# Patient Record
Sex: FEMALE | Race: WHITE | NOT HISPANIC OR LATINO | Employment: OTHER | URBAN - METROPOLITAN AREA
[De-identification: names, ages, dates, MRNs, and addresses within clinical notes are randomized per-mention and may not be internally consistent; named-entity substitution may affect disease eponyms.]

---

## 2025-04-14 ENCOUNTER — OFFICE VISIT (OUTPATIENT)
Dept: ORTHOPEDIC SURGERY | Facility: CLINIC | Age: 75
End: 2025-04-14
Payer: MEDICARE

## 2025-04-14 ENCOUNTER — HOSPITAL ENCOUNTER (OUTPATIENT)
Dept: RADIOLOGY | Facility: CLINIC | Age: 75
Discharge: HOME | End: 2025-04-14
Payer: MEDICARE

## 2025-04-14 VITALS — HEIGHT: 65 IN | WEIGHT: 213 LBS | BODY MASS INDEX: 35.49 KG/M2

## 2025-04-14 DIAGNOSIS — M54.50 LUMBAR SPINE PAIN: ICD-10-CM

## 2025-04-14 DIAGNOSIS — M54.16 LUMBAR RADICULOPATHY: ICD-10-CM

## 2025-04-14 PROCEDURE — 99214 OFFICE O/P EST MOD 30 MIN: CPT | Performed by: INTERNAL MEDICINE

## 2025-04-14 PROCEDURE — 72110 X-RAY EXAM L-2 SPINE 4/>VWS: CPT

## 2025-04-14 PROCEDURE — 99213 OFFICE O/P EST LOW 20 MIN: CPT | Performed by: INTERNAL MEDICINE

## 2025-04-14 PROCEDURE — 3008F BODY MASS INDEX DOCD: CPT | Performed by: INTERNAL MEDICINE

## 2025-04-14 RX ORDER — PREDNISONE 50 MG/1
50 TABLET ORAL DAILY
Qty: 5 TABLET | Refills: 0 | Status: SHIPPED | OUTPATIENT
Start: 2025-04-14 | End: 2025-04-19

## 2025-04-14 ASSESSMENT — PATIENT HEALTH QUESTIONNAIRE - PHQ9
2. FEELING DOWN, DEPRESSED OR HOPELESS: NOT AT ALL
1. LITTLE INTEREST OR PLEASURE IN DOING THINGS: NOT AT ALL
SUM OF ALL RESPONSES TO PHQ9 QUESTIONS 1 AND 2: 0

## 2025-04-14 NOTE — PROGRESS NOTES
Acute Injury New Patient Visit    CC:   Chief Complaint   Patient presents with    Lower Back - Pain     Patient was lifting boxes Saturday 02/12/25 and hurt her lower back.  Xrays today       HPI: Charo is a 74 y.o. female presents today for evaluation for acute low back pain which started two days ago after lifting some boxes. She denies any bladder or bowel incontinence. No pain radiating down her legs. She is here for initial evaluation and x-rays.         Review of Systems   GENERAL: Negative for malaise, significant weight loss, fever  MUSCULOSKELETAL: See HPI  NEURO:  Negative for numbness / tingling     Past Medical History  Past Medical History:   Diagnosis Date    Personal history of other diseases of the circulatory system 11/20/2014    History of hypertension    Personal history of other diseases of the circulatory system     History of essential hypertension       Medication review  Medication Documentation Review Audit    **Prior to Admission medications have not yet been reviewed**         Allergies  No Known Allergies    Social History  Social History     Socioeconomic History    Marital status:      Spouse name: Not on file    Number of children: Not on file    Years of education: Not on file    Highest education level: Not on file   Occupational History    Not on file   Tobacco Use    Smoking status: Never    Smokeless tobacco: Never   Substance and Sexual Activity    Alcohol use: Yes     Comment: Occ    Drug use: Never    Sexual activity: Not on file   Other Topics Concern    Not on file   Social History Narrative    Not on file     Social Drivers of Health     Financial Resource Strain: Low Risk  (10/1/2024)    Received from [facility]    Overall Financial Resource Strain (CARDIA)     Difficulty of Paying Living Expenses: Not hard at all   Food Insecurity: No Food Insecurity (10/1/2024)    Received from [facility]    Hunger Vital Sign     Worried About Running Out of Food in the  Last Year: Never true     Ran Out of Food in the Last Year: Never true   Transportation Needs: No Transportation Needs (10/1/2024)    Received from [facility]    PRAPARE - Transportation     Lack of Transportation (Medical): No     Lack of Transportation (Non-Medical): No   Physical Activity: Sufficiently Active (10/1/2024)    Received from [facility]    Exercise Vital Sign     Days of Exercise per Week: 5 days     Minutes of Exercise per Session: 90 min   Stress: No Stress Concern Present (10/1/2024)    Received from [facility]    Citizen of the [place] [employer] - Occupational Stress Questionnaire     Feeling of Stress : Not at all   Social Connections: Moderately Integrated (10/1/2024)    Received from [facility]    Social Connection and Isolation Panel [NHANES]     Frequency of Communication with Friends and Family: More than three times a week     Frequency of Social Gatherings with Friends and Family: Once a week     Attends Jew Services: More than 4 times per year     Active Member of Clubs or Organizations: No     Attends Club or Organization Meetings: Patient declined     Marital Status:    Intimate Partner Violence: Not At Risk (10/1/2024)    Received from [facility]    Humiliation, Afraid, Rape, and Kick questionnaire     Fear of Current or Ex-Partner: No     Emotionally Abused: No     Physically Abused: No     Sexually Abused: No   Housing Stability: Low Risk  (10/1/2024)    Received from [facility]    Housing Stability Vital Sign     Unable to Pay for Housing in the Last Year: No     Number of Times Moved in the Last Year: 0     Homeless in the Last Year: No       Surgical History  Past Surgical History:   Procedure Laterality Date    KNEE ARTHROSCOPY W/ DEBRIDEMENT  11/20/2014    Knee Arthroscopy (Therapeutic)    THYROID SURGERY  11/20/2014    Thyroid Surgery    TONSILLECTOMY  11/20/2014    Tonsillectomy       Physical Exam:  GENERAL:  Patient is awake, alert, and oriented to  person place and time.  Patient appears well nourished and well kept.  Affect Calm, Not Acutely Distressed.  HEENT:  Normocephalic, Atraumatic, EOMI  CARDIOVASCULAR:  Hemodynamically stable.  RESPIRATORY:  Normal respirations with unlabored breathing.  Extremity: Lumbar spine examination shows skin is intact.  There is no midline lumbar tenderness.  She has no pain with forward flexion and reverse extension.  Mild pain with left and right lateral rotation.  Mild pain of the SI joints.  Quadricep strength is 5/5 on the right, and 5/5 the left.  Negative straight leg test on the right leg or left leg.  She is able to walk on her tiptoes and heels with no difficulties.  She can flex both hips at 90 degrees, there is no pain with internal or external rotation.      Diagnostics: X-rays reviewed      Procedure: None    Assessment:   Lumbar degenerative disc disease      Plan: Charo  presents today for evaluation for acute low back pain which started two days ago after lifting some boxes. X-rays showed no obvious fractures but arthritic changes.  She has minimal radicular symptoms, we recommended a short dose of oral prednisone 50 mg for 5 days and physical therapy. She will follow-up with the spine team in 2-3 weeks for reevaluation.  If no improvement or increased radicular symptoms, may consider further advanced imaging such as MRI of the lumbar spine.    Orders Placed This Encounter    XR lumbar spine complete 4+ views      At the conclusion of the visit there were no further questions by the patient/family regarding their plan of care.  Patient was instructed to call or return with any issues, questions, or concerns regarding their injury and/or treatment plan described above.     04/14/25 at 9:20 AM - Javier Friedman MD  Scribe Attestation  By signing my name below, IAbdon Scribe   attest that this documentation has been prepared under the direction and in the presence of Javier Friedman,  MD.    Office: [phone]    This note was prepared using voice recognition software.  The details of this note are correct and have been reviewed, and corrected to the best of my ability.  Some grammatical errors may persist related to the Dragon software.

## 2025-04-23 ENCOUNTER — TELEPHONE (OUTPATIENT)
Dept: ORTHOPEDIC SURGERY | Facility: CLINIC | Age: 75
End: 2025-04-23
Payer: MEDICARE

## 2025-04-23 NOTE — TELEPHONE ENCOUNTER
nurse navigator Haja Chris called and left a  and stated patient was having increased symptoms, she was having pain down her legs now, with numbness.  She was asking if patients 04/30/25 appointment could be moved up.    I advised Dr. Friedman and he would like patient to be seen ASAP.  Patient's appointment was moved to 04-24-25.  Patient was called, no answer.  I did leave a very detailed message regarding the new appointment date and time and location.   I also called the nurse navigator back at [phone] to let her know of this change to the appointment as well.  She stated she would try to reach the patient as well.

## 2025-04-24 ENCOUNTER — OFFICE VISIT (OUTPATIENT)
Dept: ORTHOPEDIC SURGERY | Facility: CLINIC | Age: 75
End: 2025-04-24
Payer: MEDICARE

## 2025-04-24 DIAGNOSIS — M54.16 LUMBAR RADICULOPATHY: Primary | ICD-10-CM

## 2025-04-24 RX ORDER — CYCLOBENZAPRINE HCL 10 MG
10 TABLET ORAL 3 TIMES DAILY PRN
Qty: 30 TABLET | Refills: 0 | Status: SHIPPED | OUTPATIENT
Start: 2025-04-24 | End: 2025-05-04

## 2025-04-24 NOTE — PROGRESS NOTES
New patient established to the practice comes the office with her  who is also historian patient's condition symptoms treatment results.  She saw one of our nonsurgical orthopods.  Was put on some steroids and it can cause her to have some headaches and issues.  She tried taking some ibuprofen also.  She complains of low back pain.  After lifting something it causes pain to both her leg especially the thighs to the knees but nothing really distal.  No bowel or bladder complaints no saddle anesthesia.  No spine surgeries in the past.  Physical therapy is ordered she is starting to start that.    Looked at patient's recent blood work.  We checked a metabolic panel glucose 90 sodium 142 potassium 4.2 we looked at primary doctors notes we checked medication list I do not see any type of statin medication or major blood thinners listed in her chart.    Physical exam: Well-nourished, well kept.  No lymphangitis or lymphadenopathy in the examined extremities.  Good perfusion to the extremities ×4.  Radial and dorsalis pedis pulses 2+.  Capillary refill to all 4 digits brisk.  No distal edema x 4.  Patient ambulating with antalgic gait.  Slow to get from sitting to standing.  Full range of motion cervical spine and observation no instability.  Decreased range of motion flexion-extension rotation lumbar spine no instability moving upper extremities on any difficulty motor strength intact.   examination of the lower extremities reveals no point tenderness, swelling, or deformity.  Range of motion of the hips, knees, and ankles are full without crepitance, instability, or exacerbation of pain.  Strength is 5/5 throughout.  No redness, abrasions, or lesions on all 4 extremities, head and neck, or trunk.  Patient neurologically intact    X-ray: X-ray lumbar spine that were taken prior reviewed shows arthritic degenerative changes diffusely in the lumbar spine.  There is a scoliosis about 20 degrees on the AP.  Hip joints  preserved.  There is a spondylolisthesis at L4-5 and L5-S1 which is mildly dynamic on flexion and extension.    Assessment: This a patient with acute onset of low back pain radicular symptoms down the legs.  Sensory changes affecting her daily and bodily function that needs to be worked up ASAP.    Plan: Patient is angie continue with her physical therapy will try a mild muscle relaxant after dinner and I will get an MRI at [facility] for diagnostic purposes.

## 2025-04-30 ENCOUNTER — APPOINTMENT (OUTPATIENT)
Dept: ORTHOPEDIC SURGERY | Facility: CLINIC | Age: 75
End: 2025-04-30
Payer: MEDICARE

## 2025-05-01 DIAGNOSIS — M54.16 LUMBAR RADICULOPATHY: ICD-10-CM

## 2025-05-01 DIAGNOSIS — M54.50 LUMBAR SPINE PAIN: ICD-10-CM

## 2025-05-01 RX ORDER — CYCLOBENZAPRINE HCL 10 MG
10 TABLET ORAL 3 TIMES DAILY PRN
Qty: 30 TABLET | Refills: 0 | Status: SHIPPED | OUTPATIENT
Start: 2025-05-01 | End: 2025-05-11

## 2025-05-06 ENCOUNTER — APPOINTMENT (OUTPATIENT)
Dept: RADIOLOGY | Facility: HOSPITAL | Age: 75
End: 2025-05-06
Payer: MEDICARE

## 2025-05-06 DIAGNOSIS — M54.16 LUMBAR RADICULOPATHY: ICD-10-CM

## 2025-05-06 PROCEDURE — 72148 MRI LUMBAR SPINE W/O DYE: CPT | Performed by: RADIOLOGY

## 2025-05-06 PROCEDURE — 72148 MRI LUMBAR SPINE W/O DYE: CPT

## 2025-05-08 ENCOUNTER — APPOINTMENT (OUTPATIENT)
Dept: ORTHOPEDIC SURGERY | Facility: CLINIC | Age: 75
End: 2025-05-08
Payer: MEDICARE

## 2025-05-08 DIAGNOSIS — M54.16 LUMBAR RADICULOPATHY: Primary | ICD-10-CM

## 2025-05-08 PROCEDURE — 99214 OFFICE O/P EST MOD 30 MIN: CPT | Performed by: PHYSICIAN ASSISTANT

## 2025-05-08 NOTE — PROGRESS NOTES
Established patient here with her  to go over her lumbar MRIs.  The patient was having low back pain.  Bilateral leg symptoms down the thighs.  But nothing really distal from the knees down.  No bowel or bladder complaints no saddle anesthesia.  She cannot stand she cannot walk for long periods of time.  She has had physical therapy over-the-counter meds prescription meds and not helping.    Physical exam: Well-nourished, well kept.  No lymphangitis or lymphadenopathy in the examined extremities.  Good perfusion to the extremities ×4.  Radial and dorsalis pedis pulses 2+.  Capillary refill to all 4 digits brisk.  No distal edema x 4.  Patient ambulating with no major difficulty moving lower extremities without any major difficulty.  Some global weakness in the upper thighs..  No redness, abrasions, or lesions on the lower extremities bilaterally.  Patient neurologically intact    I looked at patient's recent bone density study which is -1.5 that was just done on October 2024.  I also spoke to the patient about blood thinner and she states she is really not taking anything besides a vitamin D.    MRI: MRI was reviewed report reviewed as well at the L1-2 there is some central stenosis.  But the foramen do not appear to be too bad I would mild bilateral foraminal narrowing.  L2-3 shows some moderate central narrowing.  With moderate narrowing of the right foramen and mild to moderate of the left.  L3-4 shows moderate central stenosis and bilateral foraminal stenosis.  L4-5 shows severe canal stenosis.  Moderate bilateral foraminal stenosis and a spondylolisthesis.  L5-S1 shows a spondylolisthesis also.  But not much in the way of central canal stenosis.  Looked at patient's x-rays also.  She does have a scoliosis.  She does collapse at that L3-4 level.  There is a spondylolisthesis at L4-5 and L5-S1.    Assessment: Is a patient with back pain bilateral leg symptoms.  Affecting her daily and bodily function we  talked about treatment option.  Talked about continuing conservative care spine epidural injections and if she gets an injection there may be delaying surgical intervention due to risk of infection we talked about surgical fixation.  She would like to discuss that.  I explained to the patient that surgery for her would be stabilizing those slips at L4-5 and L5-S1.  Possible lateral approach at that L3-4 to see if we can got a corrected scoliosis stabilization at all those levels.  And posterior lateral fusion also.  We discussed all those risk benefits and options with her.        Plan: At this point.  I will start a pain management referral in case she decides to go with injections.  I will have her see Dr. Martinez as soon as possible to go over a surgical plan with her and then she can make a decision from there.

## 2025-05-09 ENCOUNTER — OFFICE VISIT (OUTPATIENT)
Dept: ORTHOPEDIC SURGERY | Facility: CLINIC | Age: 75
End: 2025-05-09
Payer: MEDICARE

## 2025-05-09 DIAGNOSIS — M54.16 LUMBAR RADICULOPATHY: Primary | ICD-10-CM

## 2025-05-09 PROCEDURE — 99212 OFFICE O/P EST SF 10 MIN: CPT | Performed by: ORTHOPAEDIC SURGERY

## 2025-05-09 NOTE — PROGRESS NOTES
"Charo Meek \"Jen\" is a 74 y.o. female who presents for New Patient Visit of the Lower Back (Into both legs/Sent by Galindo Galeas PA-C/X-rays and MRI done at ).    HPI:  74-year-old female here for new patient surgical evaluation with low back pain and bilateral leg symptoms.  Sent by Gabino Galeas.  She denies any fever chills nausea vomiting night sweats.  She has no bowel or bladder complaints.    Physical exam:  Well-nourished, well kept.No lymphangitis or lymphadenopathy in the examined extremities.  Gait normal.  Can stand on heels and toes.   Examination of the back shows tenderness in the paraspinous musculature.  There is decreased range of motion in all directions due to guarding/muscle spasms and pain at extremes.  There is good strength and no instability.  Examination of the lower extremities reveals no point tenderness, swelling, or deformity.  Range of motion of the hips, knees, and ankles are full without crepitance, instability, or exacerbation of pain.  Strength is 5/5 throughout.  No redness, abrasions, or lesions on extremities  Gross sensation intact in the extremities.  Deep tendon reflexes 1+ bilateral. Clonus negative.  Affect normal.  Alert and oriented ×3.  Coordination normal.    Imaging studies:  We reviewed an MRI of the lumbar spine today from 8/6/2025.  X-rays of the lumbar spine from April 14, 2025 were reviewed.    Assessment:  74-year-old female here for new patient surgical evaluation with low back pain and bilateral leg symptoms right greater than left.  Sent by Gabino Galesa.  She is describing claudication symptoms and radicular pain mostly in the right greater than left leg.  She does have some minimal back pain but this is chiefly a leg issue.  This has been going on for 1 month.  Prior to a month ago she had no issues with walking or pain.  She cannot walk more than 20 or 30 feet.  She has a spondylolisthesis at L4-5 and L5-S1.  She has a significant scoliosis.  She " has got moderate to severe central stenosis at  L3-4 and L4-5 and to a lesser degree L2-3.  The patient is asking about injections versus surgery however I do not think with the patient's MRI findings and claudication symptoms that injections are going to give her any relief.  She has been doing physical therapy which is not giving her any relief.    We have reviewed test today, x-rays, MRI.  We reviewed the notes from Gabino Galeas from May 8, 2025.  This is an undiagnosed new problem with uncertain prognosis that is severely inhibiting her bodily function.  We did consider and discussed surgery today.    For complete plan and/or surgical details, please refer to Dr. Martinez's portion of this split dictation.    -Tom Rivera PA-C    In a face-to-face encounter, I performed a history and physical examination, discussed pertinent diagnostic studies if indicated, and discussed diagnosis and management strategies with both the patient and the midlevel provider.  I reviewed the midlevel's note and agree with the documented findings and plan of care.    Patient with a 1 month history of neurogenic claudication.  She now cannot walk anything more than 20 or 30 feet without having to stop and rest before she walks again.  She has buttock hamstring and thigh pain and weakness and heaviness.  She has some back pain but not that much.  No fevers chills nausea vomiting.  No bowel or bladder changes.  No history of spine surgery.  She is done physical therapy and failed that.  She says she cannot live like this anymore.  She is severely inhibited with bodily function.  There is no bowel or bladder changes.  There is no saddle anesthesia.    On exam her back is clear.  She has slight decreased back range of motion.  She has good strength no instability.  She walks slowly but steadily and normally.  She has good strength bilateral extremities.    X-rays were reviewed from April 14 and an MRI was reviewed from May 6, 2025.   She has a scoliosis centered around L2-3 L3-4 of her lumbar spine.  She has stenosis centrally at L3-4 and somewhat on the right foramen.  At L4-5 she has more significant stenosis which is moderate to severe centrally as well as bilateral foraminally from the spondylolisthesis.  At L5-S1 there is some lateral recess left greater than right foraminal stenosis and a spondylolisthesis.    Patient with above described imaging studies.  She has neurogenic claudication symptoms.  Its likely just the L4-5 level giving her problem but I think the L3-4 level is likely contributing as well.  She also has a spondylolisthesis at L4-5 and L5-S1.  I had a long discussion with the patient and explained to them that their options are 1) live with the symptoms and see how they evolve, 2) physical therapy, 3) pain management or 4) surgery.    The patient understands that surgery is elective.  They understand that surgery comes with more risks than not doing surgery.  They understand they do not have to do surgery.  However, they wish to proceed with surgery.  All of the risks, benefits, and potential complications for operative and nonoperative treatment were discussed at length with the patient.  Risks of operative intervention include, but are not limited to: 1) anesthesia complications, 2) extensive blood loss, 3) infection, 4) damage to uninjured structures including, but not limited to: The dural sac and nerve roots, 5) blood clots, and 6) lack of improvement or worsening of symptoms.  These complications could result in death, permanent disability, or need for reoperation.  Upon leaving the office today the patient completely understood these risks and wishes to proceed with operative intervention.    We did discuss different surgical options for her.  We talked about a isolated L3-S1 midline laminectomy with instrumentation at all levels and a TLIF at L4-5.  We talked about trying to somewhat correct her scoliosis with a  lateral lumbar fusion at L3-4.  We also talked about a T10 to pelvis.  We talked about the pros and cons of all the different operations the surgical length those operations and the risk of those operations.  She would like to try to get some scoliosis correction but she understands by just doing 1 level lateral fusion that will not give her much but she like to try that.  She wants to try the lateral lumbar fusion and the TLIF combination.  We are going to perform an L3-4 lateral lumbar fusion.  Will then roll the patient prone.  We will do a laminectomy from L3 down through S1 with instrumentation at all levels and a TLIF at L4-5.  Will do a posterolateral fusion at L3-4 and L5-S1.    Pedro Martinez MD  Orthopedic surgery

## 2025-05-12 PROBLEM — M43.16 SPONDYLOLISTHESIS, LUMBAR REGION: Status: ACTIVE | Noted: 2025-06-30

## 2025-05-12 PROBLEM — M48.061 SPINAL STENOSIS, LUMBAR REGION WITHOUT NEUROGENIC CLAUDICATION: Status: ACTIVE | Noted: 2025-06-30

## 2025-05-12 PROBLEM — M41.9 SCOLIOSIS, UNSPECIFIED: Status: ACTIVE | Noted: 2025-06-30

## 2025-05-12 PROBLEM — M48.07 SPINAL STENOSIS, LUMBOSACRAL REGION: Status: ACTIVE | Noted: 2025-06-30

## 2025-05-12 PROBLEM — M43.17 SPONDYLOLISTHESIS, LUMBOSACRAL REGION: Status: ACTIVE | Noted: 2025-06-30

## 2025-05-13 ENCOUNTER — APPOINTMENT (OUTPATIENT)
Dept: ORTHOPEDIC SURGERY | Facility: CLINIC | Age: 75
End: 2025-05-13
Payer: MEDICARE

## 2025-05-13 DIAGNOSIS — M54.50 LUMBAR SPINE PAIN: ICD-10-CM

## 2025-05-13 DIAGNOSIS — M54.16 LUMBAR RADICULOPATHY: ICD-10-CM

## 2025-05-13 RX ORDER — CYCLOBENZAPRINE HCL 10 MG
10 TABLET ORAL 3 TIMES DAILY PRN
Qty: 30 TABLET | Refills: 0 | Status: SHIPPED | OUTPATIENT
Start: 2025-05-13 | End: 2025-05-23

## 2025-05-16 ENCOUNTER — TELEPHONE (OUTPATIENT)
Dept: ORTHOPEDIC SURGERY | Facility: CLINIC | Age: 75
End: 2025-05-16
Payer: MEDICARE

## 2025-05-23 DIAGNOSIS — M54.50 LUMBAR SPINE PAIN: ICD-10-CM

## 2025-05-23 DIAGNOSIS — M54.16 LUMBAR RADICULOPATHY: ICD-10-CM

## 2025-05-23 RX ORDER — CYCLOBENZAPRINE HCL 10 MG
10 TABLET ORAL 3 TIMES DAILY PRN
Qty: 30 TABLET | Refills: 0 | Status: SHIPPED | OUTPATIENT
Start: 2025-05-23 | End: 2025-06-02

## 2025-05-30 DIAGNOSIS — M54.16 LUMBAR RADICULOPATHY: ICD-10-CM

## 2025-06-11 DIAGNOSIS — Z01.818 PRE-OP TESTING: ICD-10-CM

## 2025-06-16 ENCOUNTER — APPOINTMENT (OUTPATIENT)
Dept: PREADMISSION TESTING | Facility: HOSPITAL | Age: 75
End: 2025-06-16
Payer: MEDICARE

## 2025-06-17 ENCOUNTER — APPOINTMENT (OUTPATIENT)
Dept: ORTHOPEDIC SURGERY | Facility: CLINIC | Age: 75
End: 2025-06-17
Payer: MEDICARE

## 2025-06-30 ENCOUNTER — TRANSCRIBE ORDERS (OUTPATIENT)
Dept: PREADMISSION TESTING | Facility: HOSPITAL | Age: 75
End: 2025-06-30
Payer: MEDICARE

## 2025-06-30 DIAGNOSIS — Z01.818 PRE-OP TESTING: Primary | ICD-10-CM

## 2025-07-15 ENCOUNTER — APPOINTMENT (OUTPATIENT)
Dept: ORTHOPEDIC SURGERY | Facility: CLINIC | Age: 75
End: 2025-07-15
Payer: MEDICARE